# Patient Record
Sex: FEMALE | Race: WHITE | URBAN - METROPOLITAN AREA
[De-identification: names, ages, dates, MRNs, and addresses within clinical notes are randomized per-mention and may not be internally consistent; named-entity substitution may affect disease eponyms.]

---

## 2020-09-25 ENCOUNTER — OFFICE VISIT (OUTPATIENT)
Dept: ORTHOPEDIC SURGERY | Age: 30
End: 2020-09-25
Payer: MEDICAID

## 2020-09-25 VITALS
DIASTOLIC BLOOD PRESSURE: 76 MMHG | SYSTOLIC BLOOD PRESSURE: 123 MMHG | TEMPERATURE: 97.3 F | WEIGHT: 174 LBS | HEIGHT: 62 IN | OXYGEN SATURATION: 98 % | RESPIRATION RATE: 15 BRPM | HEART RATE: 73 BPM | BODY MASS INDEX: 32.02 KG/M2

## 2020-09-25 DIAGNOSIS — M54.2 CERVICAL PAIN: Primary | ICD-10-CM

## 2020-09-25 DIAGNOSIS — M54.12 RIGHT CERVICAL RADICULOPATHY: ICD-10-CM

## 2020-09-25 PROCEDURE — 99203 OFFICE O/P NEW LOW 30 MIN: CPT | Performed by: ORTHOPAEDIC SURGERY

## 2020-09-25 PROCEDURE — 72050 X-RAY EXAM NECK SPINE 4/5VWS: CPT | Performed by: ORTHOPAEDIC SURGERY

## 2020-09-25 RX ORDER — ONDANSETRON 4 MG/1
TABLET, ORALLY DISINTEGRATING ORAL
COMMUNITY
Start: 2020-09-02 | End: 2021-01-28 | Stop reason: ALTCHOICE

## 2020-09-25 RX ORDER — VENLAFAXINE HYDROCHLORIDE 150 MG/1
150 CAPSULE, EXTENDED RELEASE ORAL DAILY
COMMUNITY
Start: 2020-09-24 | End: 2022-03-11

## 2020-09-25 RX ORDER — LEVOTHYROXINE SODIUM 25 UG/1
TABLET ORAL
COMMUNITY
Start: 2020-09-11

## 2020-09-25 RX ORDER — GABAPENTIN 300 MG/1
600 CAPSULE ORAL 3 TIMES DAILY
Qty: 180 CAP | Refills: 0 | Status: SHIPPED | OUTPATIENT
Start: 2020-09-25 | End: 2020-12-03 | Stop reason: SDUPTHER

## 2020-09-25 RX ORDER — DROSPIRENONE AND ETHINYL ESTRADIOL 0.02-3(28)
1 KIT ORAL DAILY
COMMUNITY

## 2020-09-25 RX ORDER — FLUTICASONE PROPIONATE 50 MCG
2 SPRAY, SUSPENSION (ML) NASAL DAILY
COMMUNITY

## 2020-09-25 RX ORDER — ERGOCALCIFEROL 1.25 MG/1
CAPSULE ORAL
COMMUNITY
Start: 2020-07-20

## 2020-09-25 RX ORDER — GABAPENTIN 300 MG/1
600 CAPSULE ORAL
COMMUNITY
Start: 2020-09-06

## 2020-09-25 RX ORDER — VALACYCLOVIR HYDROCHLORIDE 1 G/1
TABLET, FILM COATED ORAL
COMMUNITY
Start: 2020-08-27

## 2020-09-25 RX ORDER — LORATADINE 10 MG/1
20 TABLET ORAL DAILY
COMMUNITY

## 2020-09-25 RX ORDER — MODAFINIL 100 MG/1
TABLET ORAL
COMMUNITY
Start: 2020-09-14 | End: 2022-03-11

## 2020-09-25 RX ORDER — MONTELUKAST SODIUM 10 MG/1
TABLET ORAL
COMMUNITY
Start: 2020-09-11

## 2020-09-25 RX ORDER — METHYLPREDNISOLONE 4 MG/1
1 TABLET ORAL
COMMUNITY
Start: 2020-09-22 | End: 2020-12-14 | Stop reason: ALTCHOICE

## 2020-09-25 NOTE — PROGRESS NOTES
Lisa hCen presents today for   Chief Complaint   Patient presents with    Neck Pain    Arm Pain     right       Is someone accompanying this pt? no    Is the patient using any DME equipment during OV? no      Coordination of Care:  1. Have you been to the ER, urgent care clinic since your last visit? Yes, pt went to the ER on 09/06/2020 and 09/21/2020 for neck pain and numbness in her right arm. Notes in care everywhere  Hospitalized since your last visit? no    2. Have you seen or consulted any other health care providers outside of the [address] [place] [address] since your last visit? no Include any pap smears or colon screening.  no

## 2020-09-25 NOTE — LETTER
9/25/20 Patient: Di Lou YOB: 1990 Date of Visit: 9/25/2020 PRINCE Liu 
[address] [place] [address] VIA Facsimile: [phone] Dear PRINCE Liu, Thank you for referring Ms. Russell [place] to [address] [facility] for evaluation. My notes for this consultation are attached. If you have questions, please do not hesitate to call me. I look forward to following your patient along with you.  
 
 
Sincerely, 
 
Augustus Chilel MD

## 2020-09-25 NOTE — PROGRESS NOTES
[facility]  [address] [facility]  Phone: [phone]  Fax: [phone]  INITIAL CONSULTATION  Patient: Lisa Chen                MRN: [id number]       SSN: [id number]  YOB: 1990        AGE: 31 y.o. SEX: female  Body mass index is 31.83 kg/m². PCP: PRINCE Kay  09/25/20    Chief Complaint   Patient presents with    Neck Pain    Arm Pain     right         HISTORY OF PRESENT ILLNESS, RADIOGRAPHS, and PLAN:       Ms. Melissa Mar is seen today in the ER referral.  Ms. Melissa Mar is a 61-year-old female with history of psoriasis and allergies she works as a on a horse farm. About 3 weeks ago she noted some C6 numbness when while driving in her right hand really consistent with a carpal tunnel type syndrome that then went to the left hand that progressed to more of a whole arm right arm numbness with occasional pain in her right biceps she has no real neck pain denies bowel bladder dysfunction fever chills night sweats weight loss or weight gain. She has been started on gabapentin which she takes 600 mg at night and a steroid Dosepak which appeared to help somewhat and she is now getting through her second 1. Her physical exam demonstrates good strength numbness in her hands bilaterally no reflex changes no gait changes no long track signs. MRI of her cervical spine demonstrates mild bulging of the C5 5 6 disc may be slightly right paracentral but no significant nerve root or cord compression no signal change. I do not see pathology that would explain the patient's symptoms. It could be an unusual manifestation of a mild C5-6 protrusion but that would appear to be unlikely. In any case the symptoms are relatively acute evolving going on for 3 to 4 weeks. They seem to be moderated by steroids. It is too early to get a nerve conduction test on her.   The gabapentin she is taking is ineffectual dosing I have told her to slowly ramp up to 3 times a day dosing to see if this medication can be effective for her. Otherwise we should stop it. I will see her back in a couple weeks to monitor her exam and her symptoms. If they are still present after 6 weeks I think is not unreasonable to consider nerve conduction test but still on the early side. We will see her back in 2 to 3 weeks. This dictation was created utilizing voice recognition software. Errors may be present. No past medical history on file. No family history on file. Current Outpatient Medications   Medication Sig Dispense Refill    ergocalciferol (ERGOCALCIFEROL) 1,250 mcg (50,000 unit) capsule TAKE 1 CAPSULE BY MOUTH ONCE A WEEK      drospirenone-ethinyl estradioL (MULU) 3-0.02 mg tab Take 1 Tab by mouth daily.  gabapentin (NEURONTIN) 300 mg capsule Take 600 mg by mouth nightly.  levothyroxine (SYNTHROID) 25 mcg tablet TAKE 1 TAB IN THE MORNING ON AN EMPTY STOMACH. PLEASE WAIT 30 MINUTES BEFORE TAKING ANY OTHER MEDS      methylPREDNISolone (MEDROL DOSEPACK) 4 mg tablet Take 1 Tab by mouth.  modafiniL (PROVIGIL) 100 mg tablet TAKE 1 TABLET BY MOUTH DAILY AT 7 A.M, AND 1 TABLET AT 12 PM      montelukast (SINGULAIR) 10 mg tablet TAKE 1 TABLET BY MOUTH EVERYDAY AT BEDTIME      ondansetron (ZOFRAN ODT) 4 mg disintegrating tablet PLACE 1 2 TABLETS UNDER THE TONGUE EVERY 6 HOURS AS NEEDED FOR NAUSEA/VOMITING. MAY CAUSE DROWSINESS.  valACYclovir (VALTREX) 1 gram tablet TAKE 2 TABLETS BY MOUTH AT FIRST SIGN OF RASH, REPEAT DOSE IN 12 HOURS      venlafaxine-SR (EFFEXOR-XR) 150 mg capsule Take 150 mg by mouth daily.  loratadine (Claritin) 10 mg tablet Take 10 mg by mouth daily as needed for Allergies.  fluticasone propionate (Flonase Allergy Relief) 50 mcg/actuation nasal spray 2 Sprays by Both Nostrils route daily.  USTEKINAUMAB 90 mg/mL injection 90 mg by SubCUTAneous route every three (3) months.       VENTOLIN HFA 90 mcg/actuation inhaler inhale 1-2 puff by mouth four times a day if needed as directed  0    buPROPion XL (WELLBUTRIN XL) 300 mg XL tablet Take 37.5 mg by mouth every morning. 0    clobetasol 0.05 % sham       phenazopyridine (PYRIDIUM) 200 mg tablet TAKE 1 TAB BY MOUTH 3 TIMES A DAY FOR 2 DAYS  0       No Known Allergies    No past surgical history on file. No past medical history on file. Social History     Socioeconomic History    Marital status:      Spouse name: Not on file    Number of children: Not on file    Years of education: Not on file    Highest education level: Not on file   Occupational History    Not on file   Social Needs    Financial resource strain: Not on file    Food insecurity     Worry: Not on file     Inability: Not on file    Transportation needs     Medical: Not on file     Non-medical: Not on file   Tobacco Use    Smoking status: Never Smoker    Smokeless tobacco: Never Used   Substance and Sexual Activity    Alcohol use: Yes    Drug use: No    Sexual activity: Not on file   Lifestyle    Physical activity     Days per week: Not on file     Minutes per session: Not on file    Stress: Not on file   Relationships    Social connections     Talks on phone: Not on file     Gets together: Not on file     Attends [employer]ism service: Not on file     Active member of club or organization: Not on file     Attends meetings of clubs or organizations: Not on file     Relationship status: Not on file    Intimate partner violence     Fear of current or ex partner: Not on file     Emotionally abused: Not on file     Physically abused: Not on file     Forced sexual activity: Not on file   Other Topics Concern    Not on file   Social History Narrative    Not on file           REVIEW OF SYSTEMS:   CONSTITUTIONAL SYMPTOMS:  Negative. EYES:  Negative. EARS, NOSE, THROAT AND MOUTH:  Negative. CARDIOVASCULAR:  Negative. RESPIRATORY:  Negative.    GENITOURINARY: Per HPI.   GASTROINTESTINAL:  Per HPI. INTEGUMENTARY (SKIN AND/OR BREAST):  Negative. MUSCULOSKELETAL: Per HPI.   ENDOCRINE/RHEUMATOLOGIC:  Negative. NEUROLOGICAL:  Per HPI. HEMATOLOGIC/LYMPHATIC:  Negative. ALLERGIC/IMMUNOLOGIC:  Negative. PSYCHIATRIC:  Negative. PHYSICAL EXAMINATION:   Visit Vitals  /76 (BP 1 Location: Left arm, BP Patient Position: Sitting)   Pulse 73   Temp 97.3 °F (36.3 °C) (Skin)   Resp 15   Ht 5' 2\" (1.575 m)   Wt 174 lb (78.9 kg)   LMP 09/19/2020   SpO2 98% Comment: RA   BMI 31.83 kg/m²    PAIN SCALE: 6/10    CONSTITUTIONAL: The patient is in no apparent distress and is alert and oriented x 3. HEENT: Normocephalic. Hearing grossly intact. NECK: Supple and symmetric. no tenderness, or masses were felt. RESPIRATORY: No labored breathing. CARDIOVASCULAR: The carotid pulses were normal. Peripheral pulses were 2+. CHEST: Normal AP diameter and normal contour without any kyphoscoliosis. LYMPHATIC: No lymphadenopathy was appreciated in the neck, axillae or groin. SKIN:  Negative for scars, rashes, lesions, or ulcers on the right upper, right lower, left upper, left lower and trunk. NEUROLOGICAL: Alert and oriented x 3. Ambulation without assistive device. FWB. EXTREMITIES:  See musculoskeletal.  MUSCULOSKELETAL:   Head and Neck: Negative for misalignment, asymmetry, crepitation, defects, tenderness masses or effusions.  Left Upper Extremity: Numbness and tingling. Inspection, percussion and palpation performed. Martinezs sign is negative.  Right Upper Extremity: Numbness and tingling. Inspection, percussion and palpation performed. Martinezs sign is negative.  Spine, Ribs and Pelvis: Inspection, percussion and palpation performed. Negative for misalignment, asymmetry, crepitation, defects, tenderness masses or effusions.  Left Lower Extremity: Inspection, percussion and palpation performed. Negative straight leg raise.    Right Lower Extremity: Inspection, percussion and palpation performed. Negative straight leg raise. SPINE EXAM:     Cervical spine: Neck is midline. Normal muscle tone. No focal atrophy is noted. Lumbar spine: No rash, ecchymosis, or gross obliquity. No focal atrophy is noted. Biceps  Triceps Deltoids Wrist Ext Wrist Flex Hand Intrin   Right +4/5 +4/5 +4/5 +4/5 +4/5 +4/5   Left +4/5 +4/5 +4/5 +4/5 +4/5 +4/5      Hip Flex  Quads Hamstrings Tib-Ant EHL Ankle PF/DF   Right +4/5 +4/5 +4/5 +4/5 +4/5 +4/5   Left +4/5 +4/5 +4/5 +4/5 +4/5 +4/5       ASSESSMENT    ICD-10-CM ICD-9-CM    1. Cervical pain  M54.2 723.1 AMB POC XRAY, SPINE, CERVICAL; 4+ VIE   2. Right cervical radiculopathy  M54.12 723.4 gabapentin (Neurontin) 300 mg capsule       Written by Kassie Calderon, as dictated by Manfred Jaramillo MD.    I, Dr. Manfred Jaramillo MD, confirm that all documentation is accurate.

## 2020-12-03 DIAGNOSIS — M54.12 RIGHT CERVICAL RADICULOPATHY: ICD-10-CM

## 2020-12-03 RX ORDER — GABAPENTIN 300 MG/1
600 CAPSULE ORAL 2 TIMES DAILY
Qty: 180 CAP | Refills: 1 | Status: SHIPPED | OUTPATIENT
Start: 2020-12-03 | End: 2022-03-11

## 2020-12-03 NOTE — TELEPHONE ENCOUNTER
Patient no showed her apt with Dr. Lillie Garnett. I would recommend she get the gabapentin refill from her PCP if she is not going to be following up with [facility].

## 2020-12-03 NOTE — TELEPHONE ENCOUNTER
Patient called again regarding this, she states the previous call was disconnected before she could finish giving information. She states she is supposed to work a full day today but she's only working a half day due to the pain, so she's scheduled to go to work at noon. She is asking if this refill could possibly be done before that. She confirmed again her pharmacy is [facility] on [address] [facility] and is requesting a call back when completed at [phone].

## 2020-12-03 NOTE — TELEPHONE ENCOUNTER
Patient called and is asking for a refill on Gabapentin medication from [address] [place] [address] [address] . Patient said she ran out of the medication this past Monday. Patient said that she is having pain and numbness on the right arm and left hand . [facility] on [address] [facility]. [phone]. Patient tel. [phone].

## 2020-12-03 NOTE — TELEPHONE ENCOUNTER
Spoke with pt, she stated she takes the Gabapentin 300mg 1 to 2 tabs QHS or 1 tab po BID. Pt pharmacy on file confirmed. Pt also stated that the PCP has not been seeing pt and that is why they will not fill the Gabapentin. She states all she knows is without the Gabapentin she has little to no strength in her RT arm. Unable to lift or use the arm. Pt scheduled fu with Dr. Naomi Reno for 12/18 stating that the issue has not gotten any better, it has gotten worse. Pt also inquired about the EMG that Dr. Naomi Reno mentioned. I informed her that he did not order the EMG his note stated *If they are still present after 6 weeks I think is not unreasonable to consider nerve conduction test but still on the early side. We will see her back in 2 to 3 weeks. * Informed pt that it sounded like Dr. Naomi Reno would decide at the fu if he should order the EMG. Pt states it has not gotten better, and can we order the EMG before she sees him, and have it done prior to the 18th. Please advise.

## 2020-12-03 NOTE — TELEPHONE ENCOUNTER
This is a controlled substance. Please see my message below. I am willing to send in a week supply for her, but further refills should come from PCP. Please confirm how she is taking this.

## 2020-12-14 ENCOUNTER — OFFICE VISIT (OUTPATIENT)
Dept: ORTHOPEDIC SURGERY | Age: 30
End: 2020-12-14
Payer: MEDICAID

## 2020-12-14 ENCOUNTER — TELEPHONE (OUTPATIENT)
Dept: ORTHOPEDIC SURGERY | Age: 30
End: 2020-12-14

## 2020-12-14 VITALS
DIASTOLIC BLOOD PRESSURE: 83 MMHG | OXYGEN SATURATION: 97 % | BODY MASS INDEX: 31.47 KG/M2 | TEMPERATURE: 97.2 F | WEIGHT: 171 LBS | HEIGHT: 62 IN | RESPIRATION RATE: 12 BRPM | HEART RATE: 78 BPM | SYSTOLIC BLOOD PRESSURE: 126 MMHG

## 2020-12-14 DIAGNOSIS — R94.131 ABNORMAL EMG: ICD-10-CM

## 2020-12-14 DIAGNOSIS — R20.0 NUMBNESS AND TINGLING OF RIGHT UPPER EXTREMITY: Primary | ICD-10-CM

## 2020-12-14 DIAGNOSIS — R20.2 NUMBNESS AND TINGLING OF RIGHT UPPER EXTREMITY: Primary | ICD-10-CM

## 2020-12-14 PROCEDURE — 95909 NRV CNDJ TST 5-6 STUDIES: CPT | Performed by: PHYSICAL MEDICINE & REHABILITATION

## 2020-12-14 PROCEDURE — 95886 MUSC TEST DONE W/N TEST COMP: CPT | Performed by: PHYSICAL MEDICINE & REHABILITATION

## 2020-12-14 NOTE — PROGRESS NOTES
[address]  [address]  [address]  Phone: [phone]  Fax: [phone]        Toby Montemayor  : 1990  PCP: PRINCE Mcdaniel  2020    ELECTROMYOGRAPHY AND NERVE CONDUCTION STUDIES    Evelyn De Leon was referred by Dr. Shannon Soares for electrodiagnostic evaluation of RUE paraesthesia. NCV & EMG Findings:  Evaluation of the right median motor nerve showed prolonged distal onset latency (5.1 ms). The right median sensory nerve showed no response (Wrist). All remaining nerves (as indicated in the following tables) were within normal limits. All examined muscles (as indicated in the following table) showed no evidence of electrical instability. INTERPRETATION  This is an abnormal electrodiagnostic examination. These findings may be consistent with:   1. Moderate/severe median mononeuropathy at the right wrist (carpal tunnel syndrome)    There is no electrodiagnostic evidence of any cervical radiculopathy, brachial plexopathy, peripheral polyneuropathy, or any other mononeuropathy. CLINICAL INTERPRETATION  Her electrodiagnostic findings of right carpal tunnel syndrome are consistent with her right arm symptoms. HISTORY OF PRESENT ILLNESS  Devendra Engel is a 31 y.o. female. Pt presents today for RUE EMG evaluation of right hand numbness and tingling. 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 However, when she is resting or driving, she experiences constant numbness and tingling in the right hand. It sometimes radiates up her arm as well. PAST MEDICAL HISTORY   No past medical history on file. No past surgical history on file. Dae Maza MEDICATIONS      Current Outpatient Medications   Medication Sig Dispense Refill    gabapentin (Neurontin) 300 mg capsule Take 2 Caps by mouth two (2) times a day.  Max Daily Amount: 1,200 mg. 180 Cap 1    ergocalciferol (ERGOCALCIFEROL) 1,250 mcg (50,000 unit) capsule TAKE 1 CAPSULE BY MOUTH ONCE A WEEK      drospirenone-ethinyl estradioL (MULU) 3-0.02 mg tab Take 1 Tab by mouth daily.  gabapentin (NEURONTIN) 300 mg capsule Take 600 mg by mouth nightly.  levothyroxine (SYNTHROID) 25 mcg tablet TAKE 1 TAB IN THE MORNING ON AN EMPTY STOMACH. PLEASE WAIT 30 MINUTES BEFORE TAKING ANY OTHER MEDS      modafiniL (PROVIGIL) 100 mg tablet TAKE 1 TABLET BY MOUTH DAILY AT 7 A.M, AND 1 TABLET AT 12 PM      montelukast (SINGULAIR) 10 mg tablet TAKE 1 TABLET BY MOUTH EVERYDAY AT BEDTIME      valACYclovir (VALTREX) 1 gram tablet TAKE 2 TABLETS BY MOUTH AT FIRST SIGN OF RASH, REPEAT DOSE IN 12 HOURS      venlafaxine-SR (EFFEXOR-XR) 150 mg capsule Take 150 mg by mouth daily.  loratadine (Claritin) 10 mg tablet Take 20 mg by mouth daily.  fluticasone propionate (Flonase Allergy Relief) 50 mcg/actuation nasal spray 2 Sprays by Both Nostrils route daily.  USTEKINAUMAB 90 mg/mL injection 90 mg by SubCUTAneous route every three (3) months.  VENTOLIN HFA 90 mcg/actuation inhaler inhale 1-2 puff by mouth four times a day if needed as directed  0    buPROPion XL (WELLBUTRIN XL) 300 mg XL tablet Take 200 mg by mouth every morning. 0    clobetasol 0.05 % sham       ondansetron (ZOFRAN ODT) 4 mg disintegrating tablet PLACE 1 2 TABLETS UNDER THE TONGUE EVERY 6 HOURS AS NEEDED FOR NAUSEA/VOMITING. MAY CAUSE DROWSINESS.  phenazopyridine (PYRIDIUM) 200 mg tablet TAKE 1 TAB BY MOUTH 3 TIMES A DAY FOR 2 DAYS  0        ALLERGIES  No Known Allergies       SOCIAL HISTORY    Social History     Socioeconomic History    Marital status:      Spouse name: Not on file    Number of children: Not on file    Years of education: Not on file    Highest education level: Not on file   Tobacco Use    Smoking status: Never Smoker    Smokeless tobacco: Never Used   Substance and Sexual Activity    Alcohol use:  Yes    Drug use: No       FAMILY HISTORY  No family history on file. PHYSICAL EXAMINATION  Visit Vitals  /83 (BP 1 Location: Left arm, BP Patient Position: Sitting)   Pulse 78   Temp 97.2 °F (36.2 °C) (Skin)   Resp 12   Ht 5' 2\" (1.575 m)   Wt 171 lb (77.6 kg)   SpO2 97% Comment: RA   BMI 31.28 kg/m²       Pain Assessment  12/14/2020   Location of Pain Neck;Arm   Location Modifiers Right   Severity of Pain 0   Quality of Pain Sharp; Throbbing; Other (Comment)   Quality of Pain Comment numbness to right arm/hand   Duration of Pain Persistent   Frequency of Pain Intermittent   Aggravating Factors Other (Comment)   Aggravating Factors Comment pain tends to be worse at night after working all day   Limiting Behavior Yes   Relieving Factors Nothing   Relieving Factors Comment -   Result of Injury No           Constitutional:  Well developed, well nourished, in no acute distress. Psychiatric: Affect and mood are appropriate. Integumentary: No rashes or abrasions noted on exposed areas. SPINE/MUSCULOSKELETAL EXAM    On brief examination: None.       NCV & EMG Findings:  Nerve Conduction Studies  Anti Sensory Summary Table     Stim Site NR Peak (ms) Norm Peak (ms) O-P Amp (µV) Norm O-P Amp Site1 Site2 Delta-P (ms) Dist (cm) El (m/s) Norm El (m/s)   Right Median Anti Sensory (2nd Digit)   Wrist NR  <3.6  >10 Wrist 2nd Digit  14.0  >39   Right Radial Anti Sensory (Base 1st Digit)   Wrist    1.8 <3.1 37.1  Wrist Base 1st Digit 1.8 0.0     Right Ulnar Anti Sensory (5th Digit)   Wrist    3.0 <3.7 28.7 >15.0 Wrist 5th Digit 3.0 14.0 47 >38     Motor Summary Table     Stim Site NR Onset (ms) Norm Onset (ms) O-P Amp (mV) Norm O-P Amp Site1 Site2 Delta-0 (ms) Dist (cm) El (m/s) Norm El (m/s)   Right Median Motor (Abd Poll Brev)   Wrist    5.1 <4.2 5.8 >5 Elbow Wrist 3.2 20.0 62 >50   Elbow    8.3  5.7          Right Ulnar Motor (Abd Dig Min)   Wrist    2.3 <4.2 13.4 >3 B Elbow Wrist 2.9 17.5 60 >53   B Elbow    5.2  12.8  A Elbow B Elbow 1.8 10.0 56 >53   A Elbow    7.0  12.0            EMG     Side Muscle Nerve Root Ins Act Fibs Psw Amp Dur Poly Recrt Int Min Deep Comment   Right Biceps Musculocut C5-6 Nml Nml Nml Nml Nml 0 Nml Nml    Right Triceps Radial C6-7-8 Nml Nml Nml Nml Nml 0 Nml Nml    Right PronatorTeres Median C6-7 Nml Nml Nml Nml Nml 0 Nml Nml    Right Abd Poll Brev Median C8-T1 Nml Nml Nml Nml Nml 0 Nml Nml    Right 1stDorInt Ulnar C8-T1 Nml Nml Nml Nml Nml 0 Nml Nml    Right Cervical Parasp Up Rami C1-3 Nml Nml Nml         Right Cervical Parasp Mid Rami C4-6 Nml Nml Nml         Right Cervical Parasp Low Rami C7-8 Nml Nml Nml             Nerve Conduction Studies  Anti Sensory Left/Right Comparison     Stim Site L Lat (ms) R Lat (ms) L-R Lat (ms) L Amp (µV) R Amp (µV) L-R Amp (%) Site1 Site2 L El (m/s) R El (m/s) L-R El (m/s)   Median Anti Sensory (2nd Digit)   Wrist       Wrist 2nd Digit      Radial Anti Sensory (Base 1st Digit)   Wrist  1.8   37.1  Wrist Base 1st Digit      Ulnar Anti Sensory (5th Digit)   Wrist  3.0   28.7  Wrist 5th Digit  47      Motor Left/Right Comparison     Stim Site L Lat (ms) R Lat (ms) L-R Lat (ms) L Amp (mV) R Amp (mV) L-R Amp (%) Site1 Site2 L El (m/s) R El (m/s) L-R El (m/s)   Median Motor (Abd Poll Brev)   Wrist  5.1   5.8  Elbow Wrist  62    Elbow  8.3   5.7         Ulnar Motor (Abd Dig Min)   Wrist  2.3   13.4  B Elbow Wrist  60    B Elbow  5.2   12.8  A Elbow B Elbow  56    A Elbow  7.0   12.0               Waveforms:                     [facility]  OFFICE PROCEDURE PROGRESS NOTE        Chart reviewed for the following:   Ronn IZAGUIRRE, have reviewed the History, Physical and updated the Allergic reactions for [employer]     TIME OUT performed immediately prior to start of procedure:   Ronn IZAGUIRRE, have performed the following reviews on Evelyn Greene prior to the start of the procedure:            * Patient was identified by name and date of birth   * Agreement on procedure being performed was verified  * Risks and Benefits explained to the patient  * Procedure site verified and marked as necessary  * Patient was positioned for comfort  * Consent was signed and verified     Time: 11:03 AM    Date of procedure: 12/14/2020    Procedure performed by:  Champ Hollins MD    Provider accompanied by: Pamela. Patient accompanied by: Self.     How tolerated by patient: tolerated the procedure well with no complications    Post Procedural Pain Scale: 0 - No Hurt    Comments: none    Written by Gareth Moon, [address] as dictated by Davy Espinal MD

## 2020-12-14 NOTE — PROGRESS NOTES
Felix Davis presents today for   Chief Complaint   Patient presents with    Arm Pain     right    Numbness       Is someone accompanying this pt? no    Is the patient using any DME equipment during OV? no      Coordination of Care:  1. Have you been to the ER, urgent care clinic since your last visit? no  Hospitalized since your last visit? no    2. Have you seen or consulted any other health care providers outside of the [address] [place] [address] since your last visit? no Include any pap smears or colon screening.  no

## 2020-12-18 ENCOUNTER — OFFICE VISIT (OUTPATIENT)
Dept: ORTHOPEDIC SURGERY | Age: 30
End: 2020-12-18
Payer: MEDICAID

## 2020-12-18 VITALS
WEIGHT: 171 LBS | HEART RATE: 75 BPM | HEIGHT: 62 IN | SYSTOLIC BLOOD PRESSURE: 114 MMHG | DIASTOLIC BLOOD PRESSURE: 76 MMHG | TEMPERATURE: 96.6 F | RESPIRATION RATE: 18 BRPM | OXYGEN SATURATION: 97 % | BODY MASS INDEX: 31.47 KG/M2

## 2020-12-18 DIAGNOSIS — G56.01 RIGHT CARPAL TUNNEL SYNDROME: Primary | ICD-10-CM

## 2020-12-18 DIAGNOSIS — G56.02 LEFT CARPAL TUNNEL SYNDROME: ICD-10-CM

## 2020-12-18 PROCEDURE — 20526 THER INJECTION CARP TUNNEL: CPT | Performed by: ORTHOPAEDIC SURGERY

## 2020-12-18 PROCEDURE — 99214 OFFICE O/P EST MOD 30 MIN: CPT | Performed by: ORTHOPAEDIC SURGERY

## 2020-12-18 NOTE — PROGRESS NOTES
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 Worker's Compensation and legal considerations: none filed. Vitals:    12/18/20 1002   BP: 114/76   Pulse: 75   Resp: 18   Temp: (!) 96.6 °F (35.9 °C)   SpO2: 97%   Weight: 171 lb (77.6 kg)   Height: 5' 2\" (1.575 m)   PainSc:   2           Chief Complaint   Patient presents with    Hand Pain     right         HPI: Patient comes in today with complaints of bilateral hand pain and numbness significantly worse than the right. She is recently had a right EMG but not a left. Patient says that she will have to wait till the spring to set up surgery for the right side. Date of onset: Chronic    Injury: No    Prior Treatment:  No    Numbness/ Tingling: Yes: Comment: Bilateral      ROS: Review of Systems - General ROS: negative  Psychological ROS: negative  ENT ROS: negative  Allergy and Immunology ROS: negative  Hematological and Lymphatic ROS: negative  Respiratory ROS: no cough, shortness of breath, or wheezing  Cardiovascular ROS: no chest pain or dyspnea on exertion  Gastrointestinal ROS: no abdominal pain, change in bowel habits, or black or bloody stools  Musculoskeletal ROS: positive for - pain in hand - right  Neurological ROS: positive for - numbness/tingling  Dermatological ROS: negative    Past Medical History:   Diagnosis Date    Asthma     High cholesterol        History reviewed. No pertinent surgical history. Current Outpatient Medications   Medication Sig Dispense Refill    ergocalciferol (ERGOCALCIFEROL) 1,250 mcg (50,000 unit) capsule TAKE 1 CAPSULE BY MOUTH ONCE A WEEK      drospirenone-ethinyl estradioL (MULU) 3-0.02 mg tab Take 1 Tab by mouth daily.  gabapentin (NEURONTIN) 300 mg capsule Take 600 mg by mouth nightly.  levothyroxine (SYNTHROID) 25 mcg tablet TAKE 1 TAB IN THE MORNING ON AN EMPTY STOMACH.  PLEASE WAIT 30 MINUTES BEFORE TAKING ANY OTHER MEDS      modafiniL (PROVIGIL) 100 mg tablet TAKE 1 TABLET BY MOUTH DAILY AT 7 A.M, AND 1 TABLET AT 12 PM      montelukast (SINGULAIR) 10 mg tablet TAKE 1 TABLET BY MOUTH EVERYDAY AT BEDTIME      ondansetron (ZOFRAN ODT) 4 mg disintegrating tablet PLACE 1 2 TABLETS UNDER THE TONGUE EVERY 6 HOURS AS NEEDED FOR NAUSEA/VOMITING. MAY CAUSE DROWSINESS.  valACYclovir (VALTREX) 1 gram tablet TAKE 2 TABLETS BY MOUTH AT FIRST SIGN OF RASH, REPEAT DOSE IN 12 HOURS      venlafaxine-SR (EFFEXOR-XR) 150 mg capsule Take 150 mg by mouth daily.  loratadine (Claritin) 10 mg tablet Take 20 mg by mouth daily.  fluticasone propionate (Flonase Allergy Relief) 50 mcg/actuation nasal spray 2 Sprays by Both Nostrils route daily.  USTEKINAUMAB 90 mg/mL injection 90 mg by SubCUTAneous route every three (3) months.  VENTOLIN HFA 90 mcg/actuation inhaler inhale 1-2 puff by mouth four times a day if needed as directed  0    buPROPion XL (WELLBUTRIN XL) 300 mg XL tablet Take 200 mg by mouth every morning. 0    clobetasol 0.05 % sham       gabapentin (Neurontin) 300 mg capsule Take 2 Caps by mouth two (2) times a day. Max Daily Amount: 1,200 mg. 180 Cap 1    phenazopyridine (PYRIDIUM) 200 mg tablet TAKE 1 TAB BY MOUTH 3 TIMES A DAY FOR 2 DAYS  0     Current Facility-Administered Medications   Medication Dose Route Frequency Provider Last Rate Last Admin    triamcinolone acetonide (KENALOG) 10 mg/mL injection 10 mg  10 mg Other ONCE Marc Copeland, DO           No Known Allergies        PE:     Physical Exam  Vitals signs and nursing note reviewed. Constitutional:       General: She is not in acute distress. Appearance: Normal appearance. She is not ill-appearing. Eyes:      Extraocular Movements: Extraocular movements intact. Pupils: Pupils are equal, round, and reactive to light. Neck:      Musculoskeletal: Normal range of motion and neck supple. Cardiovascular:      Pulses: Normal pulses.    Pulmonary:      Effort: Pulmonary effort is normal. No respiratory distress. Abdominal:      General: Abdomen is flat. There is no distension. Musculoskeletal: Normal range of motion. General: No swelling, tenderness, deformity or signs of injury. Right lower leg: No edema. Left lower leg: No edema. Skin:     General: Skin is warm and dry. Capillary Refill: Capillary refill takes less than 2 seconds. Findings: No bruising or erythema. Neurological:      General: No focal deficit present. Mental Status: She is alert and oriented to person, place, and time. Cranial Nerves: No cranial nerve deficit. Sensory: No sensory deficit. Psychiatric:         Mood and Affect: Mood normal.         Behavior: Behavior normal.            NEUROVASCULAR    Examination L R Examination L R   Carpal Comp. ? + Pronator Comp. - -   Carpal Tinel ? + Pronator Tinel - -   Phalen's ? + Pronator Stress - -   Cubital Comp. - - Guyon Comp. - -   Cubital Tinel - - Guyon Tinel - -   Elbow Hyperflexion - - Adson's - -   Spurling's - - SC Comp. - -   PCB Median abn - - SC Tinel - -   Radial Tinel - - IC Comp. - -   Digital Tinel - - IC Tinel - -   Radial 2-Pt WNL WNL Ulnar 2-Pt WNL WNL     Radial Pulse: 2+  Capillary Refill: < 2 sec  Anton[employer]: Not Performed  [employer]: Not Performed      NCV & EMG Findings:  Evaluation of the right median motor nerve showed prolonged distal onset latency (5.1 ms). The right median sensory nerve showed no response (Wrist). All remaining nerves (as indicated in the following tables) were within normal limits.       All examined muscles (as indicated in the following table) showed no evidence of electrical instability.       INTERPRETATION  This is an abnormal electrodiagnostic examination. These findings may be consistent with:   1.  Moderate/severe median mononeuropathy at the right wrist (carpal tunnel syndrome)     There is no electrodiagnostic evidence of any cervical radiculopathy, brachial plexopathy, peripheral polyneuropathy, or any other mononeuropathy.     CLINICAL INTERPRETATION  Her electrodiagnostic findings of right carpal tunnel syndrome are consistent with her right arm symptoms. Imaging:     None indicated        ICD-10-CM ICD-9-CM    1. Right carpal tunnel syndrome  G56.01 354.0 AMB SUPPLY ORDER      triamcinolone acetonide (KENALOG) 10 mg/mL injection 10 mg      INJECT CARPAL TUNNEL   2. Left carpal tunnel syndrome  G56.02 354.0 AMB SUPPLY ORDER      EMG ONE EXTREMITY UPPER LT      NCV/LAT MOTOR PER NERVE UP/LT      triamcinolone acetonide (KENALOG) 10 mg/mL injection 10 mg      INJECT CARPAL TUNNEL         Plan:     Bilateral carpal tunnel injections. Bilateral carpal tunnel braces. Left upper extremity EMG    Follow-up and Dispositions    · Return in about 3 months (around 3/18/2021) for Reevaluation, left EMG review, and surgical discussion for right. Plan was reviewed with patient, who verbalized agreement and understanding of the plan    [address] [facility] NOTE        Chart reviewed for the following:   Marc IZAGUIRRE DO, have reviewed the History, Physical and updated the Allergic reactions for [employer]     TIME OUT performed immediately prior to start of procedure:   Marc IZAGUIRRE DO, have performed the following reviews on [employer] prior to the start of the procedure:            * Patient was identified by name and date of birth   * Agreement on procedure being performed was verified  * Risks and Benefits explained to the patient  * Procedure site verified and marked as necessary  * Patient was positioned for comfort  * Consent was signed and verified     Time: 10:32 AM      Date of procedure: 12/18/2020    Procedure performed by:   Yoselin Pink DO    Provider assisted by: Sebastian Hollingsworth LPN    Patient assisted by: self    How tolerated by patient: tolerated the procedure well with no complications    Post Procedural Pain Scale: 0 - No Hurt    Comments: none    Procedure:  After consent was obtained, using sterile technique the carpal tunnel was prepped. Local anesthetic used: 1% lidocaine. Kenalog 5 mg X2 and was then injected and the needle withdrawn. The procedure was well tolerated. The patient is asked to continue to rest the area for a few more days before resuming regular activities. It may be more painful for the first 1-2 days. Watch for fever, or increased swelling or persistent pain in the joint. Call or return to clinic prn if such symptoms occur or there is failure to improve as anticipated.

## 2020-12-25 DIAGNOSIS — G56.02 LEFT CARPAL TUNNEL SYNDROME: ICD-10-CM

## 2021-01-11 ENCOUNTER — TELEPHONE (OUTPATIENT)
Dept: ORTHOPEDIC SURGERY | Age: 31
End: 2021-01-11

## 2021-01-11 NOTE — TELEPHONE ENCOUNTER
Pt lost her job due to [place] so she now has the time to do the carpal tunnel surgery on her right side and wishes to proceed with scheduling. Please contact pt at [phone] or [phone].

## 2021-01-13 DIAGNOSIS — Z01.818 PREOP EXAMINATION: Primary | ICD-10-CM

## 2021-01-13 NOTE — TELEPHONE ENCOUNTER
Spoke with patient and scheduled for [facility] on 1/28. H&P appt 1/22 at [facility].

## 2021-01-22 ENCOUNTER — OFFICE VISIT (OUTPATIENT)
Dept: ORTHOPEDIC SURGERY | Age: 31
End: 2021-01-22

## 2021-01-22 VITALS
TEMPERATURE: 98.4 F | BODY MASS INDEX: 30.98 KG/M2 | HEART RATE: 72 BPM | DIASTOLIC BLOOD PRESSURE: 78 MMHG | WEIGHT: 169.4 LBS | SYSTOLIC BLOOD PRESSURE: 120 MMHG | OXYGEN SATURATION: 100 %

## 2021-01-22 DIAGNOSIS — Z01.818 PREOP EXAMINATION: ICD-10-CM

## 2021-01-22 DIAGNOSIS — G56.01 RIGHT CARPAL TUNNEL SYNDROME: Primary | ICD-10-CM

## 2021-01-22 NOTE — PROGRESS NOTES
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 Worker's Compensation and legal considerations: none filed. Vitals:    01/22/21 1319   BP: 120/78   Pulse: 72   Temp: 98.4 °F (36.9 °C)   TempSrc: Tympanic   SpO2: 100%   Weight: 169 lb 6.4 oz (76.8 kg)   PainSc:   0 - No pain   PainLoc: Hand           Chief Complaint   Patient presents with    Hand Pain       HPI: Patient presents today for preoperative history and physical as she is scheduled for a right carpal tunnel release. She previously had injections that helped however the numbness is returning on the right. Initial HPI: Patient comes in today with complaints of bilateral hand pain and numbness significantly worse than the right. She is recently had a right EMG but not a left. Patient says that she will have to wait till the spring to set up surgery for the right side. Date of onset: Chronic    Injury: No    Prior Treatment:  Yes: Comment: Bilateral carpal tunnel injections    Numbness/ Tingling: Yes: Comment: Bilateral      ROS: Review of Systems - General ROS: negative  Psychological ROS: negative  ENT ROS: negative  Allergy and Immunology ROS: negative  Hematological and Lymphatic ROS: negative  Respiratory ROS: no cough, shortness of breath, or wheezing  Cardiovascular ROS: no chest pain or dyspnea on exertion  Gastrointestinal ROS: no abdominal pain, change in bowel habits, or black or bloody stools  Musculoskeletal ROS: positive for - pain in hand - right  Neurological ROS: positive for - numbness/tingling  Dermatological ROS: negative    Past Medical History:   Diagnosis Date    Asthma     High cholesterol        No past surgical history on file. Current Outpatient Medications   Medication Sig Dispense Refill    gabapentin (Neurontin) 300 mg capsule Take 2 Caps by mouth two (2) times a day.  Max Daily Amount: 1,200 mg. 180 Cap 1    ergocalciferol (ERGOCALCIFEROL) 1,250 mcg (50,000 unit) capsule TAKE 1 CAPSULE BY MOUTH ONCE A WEEK      drospirenone-ethinyl estradioL (MULU) 3-0.02 mg tab Take 1 Tab by mouth daily.  gabapentin (NEURONTIN) 300 mg capsule Take 600 mg by mouth nightly.  levothyroxine (SYNTHROID) 25 mcg tablet TAKE 1 TAB IN THE MORNING ON AN EMPTY STOMACH. PLEASE WAIT 30 MINUTES BEFORE TAKING ANY OTHER MEDS      modafiniL (PROVIGIL) 100 mg tablet TAKE 1 TABLET BY MOUTH DAILY AT 7 A.M, AND 1 TABLET AT 12 PM      montelukast (SINGULAIR) 10 mg tablet TAKE 1 TABLET BY MOUTH EVERYDAY AT BEDTIME      ondansetron (ZOFRAN ODT) 4 mg disintegrating tablet PLACE 1 2 TABLETS UNDER THE TONGUE EVERY 6 HOURS AS NEEDED FOR NAUSEA/VOMITING. MAY CAUSE DROWSINESS.  valACYclovir (VALTREX) 1 gram tablet TAKE 2 TABLETS BY MOUTH AT FIRST SIGN OF RASH, REPEAT DOSE IN 12 HOURS      venlafaxine-SR (EFFEXOR-XR) 150 mg capsule Take 150 mg by mouth daily.  loratadine (Claritin) 10 mg tablet Take 20 mg by mouth daily.  fluticasone propionate (Flonase Allergy Relief) 50 mcg/actuation nasal spray 2 Sprays by Both Nostrils route daily.  USTEKINAUMAB 90 mg/mL injection 90 mg by SubCUTAneous route every three (3) months.  VENTOLIN HFA 90 mcg/actuation inhaler inhale 1-2 puff by mouth four times a day if needed as directed  0    buPROPion XL (WELLBUTRIN XL) 300 mg XL tablet Take 200 mg by mouth every morning. 0    clobetasol 0.05 % sham          No Known Allergies        PE:     Physical Exam  Vitals signs and nursing note reviewed. Constitutional:       General: She is not in acute distress. Appearance: Normal appearance. She is not ill-appearing. Eyes:      Extraocular Movements: Extraocular movements intact. Pupils: Pupils are equal, round, and reactive to light. Neck:      Musculoskeletal: Normal range of motion and neck supple. Cardiovascular:      Pulses: Normal pulses. Pulmonary:      Effort: Pulmonary effort is normal. No respiratory distress.    Abdominal:      General: Abdomen is flat. There is no distension. Musculoskeletal: Normal range of motion. General: No swelling, tenderness, deformity or signs of injury. Right lower leg: No edema. Left lower leg: No edema. Skin:     General: Skin is warm and dry. Capillary Refill: Capillary refill takes less than 2 seconds. Findings: No bruising or erythema. Neurological:      General: No focal deficit present. Mental Status: She is alert and oriented to person, place, and time. Cranial Nerves: No cranial nerve deficit. Sensory: No sensory deficit. Psychiatric:         Mood and Affect: Mood normal.         Behavior: Behavior normal.            NEUROVASCULAR    Examination L R Examination L R   Carpal Comp. ? + Pronator Comp. - -   Carpal Tinel ? + Pronator Tinel - -   Phalen's ? + Pronator Stress - -   Cubital Comp. - - Guyon Comp. - -   Cubital Tinel - - Guyon Tinel - -   Elbow Hyperflexion - - Adson's - -   Spurling's - - SC Comp. - -   PCB Median abn - - SC Tinel - -   Radial Tinel - - IC Comp. - -   Digital Tinel - - IC Tinel - -   Radial 2-Pt WNL WNL Ulnar 2-Pt WNL WNL     Radial Pulse: 2+  Capillary Refill: < 2 sec  [employer]: Not Performed  [employer]: Not Performed      NCV & EMG Findings:  Evaluation of the right median motor nerve showed prolonged distal onset latency (5.1 ms). The right median sensory nerve showed no response (Wrist). All remaining nerves (as indicated in the following tables) were within normal limits.       All examined muscles (as indicated in the following table) showed no evidence of electrical instability.       INTERPRETATION  This is an abnormal electrodiagnostic examination. These findings may be consistent with:   1.  Moderate/severe median mononeuropathy at the right wrist (carpal tunnel syndrome)     There is no electrodiagnostic evidence of any cervical radiculopathy, brachial plexopathy, peripheral polyneuropathy, or any other mononeuropathy.     CLINICAL INTERPRETATION  Her electrodiagnostic findings of right carpal tunnel syndrome are consistent with her right arm symptoms. Imaging:     None indicated        ICD-10-CM ICD-9-CM    1. Right carpal tunnel syndrome  G56.01 354.0          Plan:     Proceed to schedule for right carpal tunnel release    This procedure has been fully reviewed with the patient and written informed consent has been obtained. The patient was counseled at length about the risks of jacob Covid-19 during their perioperative period and any recovery window from their procedure. The patient was made aware that jacob Covid-19  may worsen their prognosis for recovering from their procedure and lend to a higher morbidity and/or mortality risk. All material risks, benefits, and reasonable alternatives including postponing the procedure were discussed. The patient does  wish to proceed with the procedure at this time. Follow-up and Dispositions    · Return for as scheduled for postop wound check.           Plan was reviewed with patient, who verbalized agreement and understanding of the plan

## 2021-01-24 ENCOUNTER — PATIENT MESSAGE (OUTPATIENT)
Dept: ORTHOPEDIC SURGERY | Age: 31
End: 2021-01-24

## 2021-01-25 NOTE — TELEPHONE ENCOUNTER
Attempted to reach patient to answer questions sent via [employer] message. Please transfer patient to me if she call back.

## 2021-01-28 DIAGNOSIS — R11.2 DRUG-INDUCED NAUSEA AND VOMITING: ICD-10-CM

## 2021-01-28 DIAGNOSIS — T50.905A DRUG-INDUCED NAUSEA AND VOMITING: ICD-10-CM

## 2021-01-28 DIAGNOSIS — Z98.890 S/P CARPAL TUNNEL RELEASE: ICD-10-CM

## 2021-01-28 DIAGNOSIS — G56.01 RIGHT CARPAL TUNNEL SYNDROME: Primary | ICD-10-CM

## 2021-01-28 RX ORDER — ONDANSETRON 4 MG/1
4 TABLET, ORALLY DISINTEGRATING ORAL
Qty: 9 TAB | Refills: 0 | Status: SHIPPED | OUTPATIENT
Start: 2021-01-28 | End: 2021-01-31

## 2021-01-28 RX ORDER — HYDROCODONE BITARTRATE AND ACETAMINOPHEN 5; 325 MG/1; MG/1
1 TABLET ORAL
Qty: 12 TAB | Refills: 0 | Status: SHIPPED | OUTPATIENT
Start: 2021-01-28 | End: 2021-01-31

## 2021-05-06 ENCOUNTER — OFFICE VISIT (OUTPATIENT)
Dept: ORTHOPEDIC SURGERY | Age: 31
End: 2021-05-06
Payer: OTHER GOVERNMENT

## 2021-05-06 VITALS
BODY MASS INDEX: 32.02 KG/M2 | HEART RATE: 94 BPM | OXYGEN SATURATION: 99 % | TEMPERATURE: 97.3 F | WEIGHT: 174 LBS | HEIGHT: 62 IN

## 2021-05-06 DIAGNOSIS — G56.01 RIGHT CARPAL TUNNEL SYNDROME: ICD-10-CM

## 2021-05-06 DIAGNOSIS — Z98.890 S/P CARPAL TUNNEL RELEASE: ICD-10-CM

## 2021-05-06 DIAGNOSIS — G56.02 LEFT CARPAL TUNNEL SYNDROME: Primary | ICD-10-CM

## 2021-05-06 PROCEDURE — 99213 OFFICE O/P EST LOW 20 MIN: CPT | Performed by: ORTHOPAEDIC SURGERY

## 2021-05-06 PROCEDURE — 20526 THER INJECTION CARP TUNNEL: CPT | Performed by: ORTHOPAEDIC SURGERY

## 2021-05-06 NOTE — PROGRESS NOTES
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 Worker's Compensation and legal considerations: none filed. Vitals:    05/06/21 1514   Pulse: 94   Temp: 97.3 °F (36.3 °C)   TempSrc: Temporal   SpO2: 99%   Weight: 174 lb (78.9 kg)   Height: 5' 2\" (1.575 m)   PainSc:   1   PainLoc: Hand           Chief Complaint   Patient presents with    Hand Pain     left hand       HPI: Patient presents today with a new problem of left hand pain numbness and tingling. She previously had right carpal tunnel release but has not been seen for follow-up since. She reports that it healed well however she still has some tenderness on the right hand. Preop HPI: Patient presents today for preoperative history and physical as she is scheduled for a right carpal tunnel release. She previously had injections that helped however the numbness is returning on the right. Initial HPI: Patient comes in today with complaints of bilateral hand pain and numbness significantly worse than the right. She is recently had a right EMG but not a left. Patient says that she will have to wait till the spring to set up surgery for the right side.     Date of onset: Chronic    Injury: No    Prior Treatment:  Yes: Comment: Bilateral carpal tunnel injections and right carpal tunnel release    Numbness/ Tingling: Yes: Comment: Bilateral      ROS: Review of Systems - General ROS: negative  Psychological ROS: negative  ENT ROS: negative  Allergy and Immunology ROS: negative  Hematological and Lymphatic ROS: negative  Respiratory ROS: no cough, shortness of breath, or wheezing  Cardiovascular ROS: no chest pain or dyspnea on exertion  Gastrointestinal ROS: no abdominal pain, change in bowel habits, or black or bloody stools  Musculoskeletal ROS: positive for - pain in hand - right  Neurological ROS: positive for - numbness/tingling  Dermatological ROS: negative    Past Medical History:   Diagnosis Date    Asthma     High cholesterol Past Surgical History:   Procedure Laterality Date    FOOT/TOES SURGERY PROC UNLISTED Left 02/2016    left foot reconstruction    HAND/FINGER SURGERY UNLISTED Right     right hand CTR       Current Outpatient Medications   Medication Sig Dispense Refill    gabapentin (Neurontin) 300 mg capsule Take 2 Caps by mouth two (2) times a day. Max Daily Amount: 1,200 mg. 180 Cap 1    ergocalciferol (ERGOCALCIFEROL) 1,250 mcg (50,000 unit) capsule TAKE 1 CAPSULE BY MOUTH ONCE A WEEK      gabapentin (NEURONTIN) 300 mg capsule Take 600 mg by mouth nightly.  levothyroxine (SYNTHROID) 25 mcg tablet TAKE 1 TAB IN THE MORNING ON AN EMPTY STOMACH. PLEASE WAIT 30 MINUTES BEFORE TAKING ANY OTHER MEDS      modafiniL (PROVIGIL) 100 mg tablet TAKE 1 TABLET BY MOUTH DAILY AT 7 A.M, AND 1 TABLET AT 12 PM      montelukast (SINGULAIR) 10 mg tablet TAKE 1 TABLET BY MOUTH EVERYDAY AT BEDTIME      valACYclovir (VALTREX) 1 gram tablet TAKE 2 TABLETS BY MOUTH AT FIRST SIGN OF RASH, REPEAT DOSE IN 12 HOURS      venlafaxine-SR (EFFEXOR-XR) 150 mg capsule Take 150 mg by mouth daily.  loratadine (Claritin) 10 mg tablet Take 20 mg by mouth daily.  fluticasone propionate (Flonase Allergy Relief) 50 mcg/actuation nasal spray 2 Sprays by Both Nostrils route daily.  USTEKINAUMAB 90 mg/mL injection 90 mg by SubCUTAneous route every three (3) months.  VENTOLIN HFA 90 mcg/actuation inhaler inhale 1-2 puff by mouth four times a day if needed as directed  0    buPROPion XL (WELLBUTRIN XL) 300 mg XL tablet Take 200 mg by mouth every morning. 0    clobetasol 0.05 % sham       drospirenone-ethinyl estradioL (MULU) 3-0.02 mg tab Take 1 Tab by mouth daily.        Current Facility-Administered Medications   Medication Dose Route Frequency Provider Last Rate Last Admin    triamcinolone acetonide (KENALOG) 10 mg/mL injection 5 mg  5 mg Other ONCE Marc Copeland, DO           No Known Allergies        PE:     Physical Exam  Vitals signs and nursing note reviewed. Constitutional:       General: She is not in acute distress. Appearance: Normal appearance. She is not ill-appearing. Neck:      Musculoskeletal: Normal range of motion and neck supple. Cardiovascular:      Pulses: Normal pulses. Pulmonary:      Effort: Pulmonary effort is normal.   Musculoskeletal: Normal range of motion. General: No swelling, tenderness, deformity or signs of injury. Right lower leg: No edema. Left lower leg: No edema. Skin:     General: Skin is warm and dry. Capillary Refill: Capillary refill takes less than 2 seconds. Findings: No bruising or erythema. Neurological:      General: No focal deficit present. Mental Status: She is alert and oriented to person, place, and time. Cranial Nerves: No cranial nerve deficit. Sensory: No sensory deficit. Psychiatric:         Mood and Affect: Mood normal.         Behavior: Behavior normal.            NEUROVASCULAR    Examination L R Examination L R   Carpal Comp. -  Pronator Comp. - -   Carpal Tinel +  Pronator Tinel - -   Phalen's ? Pronator Stress - -   Cubital Comp. - - Guyon Comp. - -   Cubital Tinel - - Guyon Tinel - -   Elbow Hyperflexion - - Adson's - -   Spurling's - - SC Comp. - -   PCB Median abn - - SC Tinel - -   Radial Tinel - - IC Comp. - -   Digital Tinel - - IC Tinel - -   Radial 2-Pt WNL WNL Ulnar 2-Pt WNL WNL     Radial Pulse: 2+  Capillary Refill: < 2 sec  [employer]y: Not Performed  [employer]: Not Performed      NCV & EMG Findings:  Evaluation of the right median motor nerve showed prolonged distal onset latency (5.1 ms). The right median sensory nerve showed no response (Wrist).   All remaining nerves (as indicated in the following tables) were within normal limits.       All examined muscles (as indicated in the following table) showed no evidence of electrical instability.       INTERPRETATION  This is an abnormal electrodiagnostic examination. These findings may be consistent with:   1. Moderate/severe median mononeuropathy at the right wrist (carpal tunnel syndrome)     There is no electrodiagnostic evidence of any cervical radiculopathy, brachial plexopathy, peripheral polyneuropathy, or any other mononeuropathy.     CLINICAL INTERPRETATION  Her electrodiagnostic findings of right carpal tunnel syndrome are consistent with her right arm symptoms. Imaging:     None indicated        ICD-10-CM ICD-9-CM    1. Left carpal tunnel syndrome  G56.02 354.0 NCV/LAT MOTOR PER NERVE UP/LT      EMG ONE EXTREMITY UPPER LT      triamcinolone acetonide (KENALOG) 10 mg/mL injection 5 mg      INJECT CARPAL TUNNEL   2. Right carpal tunnel syndrome  G56.01 354.0    3. S/P carpal tunnel release  Z98.890 V45.89          Plan:     Left carpal tunnel injection and left upper extremity EMG    Continue nighttime brace wear    Also discussed scar massage for right side. Follow-up and Dispositions    · Return if symptoms worsen or fail to improve. Plan was reviewed with patient, who verbalized agreement and understanding of the plan    [address] [facility] NOTE        Chart reviewed for the following:   Marc IZAGUIRRE DO, have reviewed the History, Physical and updated the Allergic reactions for [address] [place] [address] performed immediately prior to start of procedure:   Marc IZAGUIRRE DO, have performed the following reviews on [address] [place] [address] prior to the start of the procedure:            * Patient was identified by name and date of birth   * Agreement on procedure being performed was verified  * Risks and Benefits explained to the patient  * Procedure site verified and marked as necessary  * Patient was positioned for comfort  * Consent was signed and verified     Time: 15:25      Date of procedure: 5/6/2021    Procedure performed by:   Heidi Choi DO    Provider assisted by: Judy Peterson LPN    Patient assisted by: self    How tolerated by patient: tolerated the procedure well with no complications    Post Procedural Pain Scale: 0 - No Hurt    Comments: none    Procedure:  After consent was obtained, using sterile technique the left carpal tunnel was prepped. Local anesthetic used: 1% lidocaine. Kenalog 5 mg and was then injected and the needle withdrawn. The procedure was well tolerated. The patient is asked to continue to rest the area for a few more days before resuming regular activities. It may be more painful for the first 1-2 days. Watch for fever, or increased swelling or persistent pain in the joint. Call or return to clinic prn if such symptoms occur or there is failure to improve as anticipated.

## 2021-05-13 DIAGNOSIS — G56.02 LEFT CARPAL TUNNEL SYNDROME: ICD-10-CM

## 2021-09-09 ENCOUNTER — OFFICE VISIT (OUTPATIENT)
Dept: ORTHOPEDIC SURGERY | Age: 31
End: 2021-09-09
Payer: OTHER GOVERNMENT

## 2021-09-09 VITALS
HEIGHT: 62 IN | HEART RATE: 95 BPM | BODY MASS INDEX: 32.02 KG/M2 | TEMPERATURE: 97.6 F | WEIGHT: 174 LBS | OXYGEN SATURATION: 98 %

## 2021-09-09 DIAGNOSIS — G56.02 LEFT CARPAL TUNNEL SYNDROME: Primary | ICD-10-CM

## 2021-09-09 PROCEDURE — 99213 OFFICE O/P EST LOW 20 MIN: CPT | Performed by: ORTHOPAEDIC SURGERY

## 2021-09-09 PROCEDURE — 20526 THER INJECTION CARP TUNNEL: CPT | Performed by: ORTHOPAEDIC SURGERY

## 2021-09-09 RX ORDER — DICLOFENAC SODIUM 75 MG/1
75 TABLET, DELAYED RELEASE ORAL 2 TIMES DAILY WITH MEALS
Qty: 20 TABLET | Refills: 0 | Status: SHIPPED | OUTPATIENT
Start: 2021-09-09 | End: 2021-09-10 | Stop reason: CLARIF

## 2021-09-09 NOTE — LETTER
NOTIFICATION RETURN TO WORK    9/9/2021 11:45 AM    Ms. Emely Valenzuela  [address] [address]      To Whom It May Concern:    Emely Valenzuela is currently under the care of [address] [place] [address] Uriah Arnett. She was seen in our office today 9/9/2021. If there are questions or concerns please have the patient contact our office.         Sincerely,      Zain Millard, DO

## 2021-09-09 NOTE — PROGRESS NOTES
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 Worker's Compensation and legal considerations: none filed. Vitals:    09/09/21 1047   Pulse: 95   Temp: 97.6 °F (36.4 °C)   TempSrc: Temporal   SpO2: 98%   Weight: 174 lb (78.9 kg)   Height: 5' 2\" (1.575 m)   PainSc:   9   PainLoc: Hand           Chief Complaint   Patient presents with    Hand Pain     left hand        HPI: Patient presents today requesting surgery for her left carpal tunnel syndrome. We previously scheduled EMGs but she canceled these and has not had them rescheduled. She would also like an injection today. 8/25/2021 HPI: Patient presents today with a new problem of left hand pain numbness and tingling. She previously had right carpal tunnel release but has not been seen for follow-up since. She reports that it healed well however she still has some tenderness on the right hand. Preop HPI: Patient presents today for preoperative history and physical as she is scheduled for a right carpal tunnel release. She previously had injections that helped however the numbness is returning on the right. Initial HPI: Patient comes in today with complaints of bilateral hand pain and numbness significantly worse than the right. She is recently had a right EMG but not a left. Patient says that she will have to wait till the spring to set up surgery for the right side.     Date of onset: Chronic    Injury: No    Prior Treatment:  Yes: Comment: Bilateral carpal tunnel injections and right carpal tunnel release    Numbness/ Tingling: Yes: Comment: Bilateral      ROS: Review of Systems - General ROS: negative  Psychological ROS: negative  ENT ROS: negative  Allergy and Immunology ROS: negative  Hematological and Lymphatic ROS: negative  Respiratory ROS: no cough, shortness of breath, or wheezing  Cardiovascular ROS: no chest pain or dyspnea on exertion  Gastrointestinal ROS: no abdominal pain, change in bowel habits, or black or bloody stools  Musculoskeletal ROS: positive for - pain in hand - right  Neurological ROS: positive for - numbness/tingling  Dermatological ROS: negative    Past Medical History:   Diagnosis Date    Asthma     High cholesterol        Past Surgical History:   Procedure Laterality Date    FOOT/TOES SURGERY PROC UNLISTED Left 02/2016    left foot reconstruction    HAND/FINGER SURGERY UNLISTED Right     right hand CTR       Current Outpatient Medications   Medication Sig Dispense Refill    diclofenac EC (VOLTAREN) 75 mg EC tablet Take 1 Tablet by mouth two (2) times daily (with meals) for 10 days. 20 Tablet 0    gabapentin (Neurontin) 300 mg capsule Take 2 Caps by mouth two (2) times a day. Max Daily Amount: 1,200 mg. 180 Cap 1    ergocalciferol (ERGOCALCIFEROL) 1,250 mcg (50,000 unit) capsule TAKE 1 CAPSULE BY MOUTH ONCE A WEEK      gabapentin (NEURONTIN) 300 mg capsule Take 600 mg by mouth nightly.  levothyroxine (SYNTHROID) 25 mcg tablet TAKE 1 TAB IN THE MORNING ON AN EMPTY STOMACH. PLEASE WAIT 30 MINUTES BEFORE TAKING ANY OTHER MEDS      modafiniL (PROVIGIL) 100 mg tablet TAKE 1 TABLET BY MOUTH DAILY AT 7 A.M, AND 1 TABLET AT 12 PM      montelukast (SINGULAIR) 10 mg tablet TAKE 1 TABLET BY MOUTH EVERYDAY AT BEDTIME      valACYclovir (VALTREX) 1 gram tablet TAKE 2 TABLETS BY MOUTH AT FIRST SIGN OF RASH, REPEAT DOSE IN 12 HOURS      venlafaxine-SR (EFFEXOR-XR) 150 mg capsule Take 150 mg by mouth daily.  loratadine (Claritin) 10 mg tablet Take 20 mg by mouth daily.  fluticasone propionate (Flonase Allergy Relief) 50 mcg/actuation nasal spray 2 Sprays by Both Nostrils route daily.  USTEKINAUMAB 90 mg/mL injection 90 mg by SubCUTAneous route every three (3) months.  VENTOLIN HFA 90 mcg/actuation inhaler inhale 1-2 puff by mouth four times a day if needed as directed  0    buPROPion XL (WELLBUTRIN XL) 300 mg XL tablet Take 200 mg by mouth every morning.   0    clobetasol 0.05 % sham  drospirenone-ethinyl estradioL (MULU) 3-0.02 mg tab Take 1 Tab by mouth daily. (Patient not taking: Reported on 9/9/2021)         No Known Allergies        PE:     Physical Exam  Vitals and nursing note reviewed. Constitutional:       General: She is not in acute distress. Appearance: Normal appearance. She is not ill-appearing. Cardiovascular:      Pulses: Normal pulses. Pulmonary:      Effort: Pulmonary effort is normal.   Musculoskeletal:         General: No swelling, tenderness, deformity or signs of injury. Normal range of motion. Cervical back: Normal range of motion and neck supple. Right lower leg: No edema. Left lower leg: No edema. Skin:     General: Skin is warm and dry. Capillary Refill: Capillary refill takes less than 2 seconds. Findings: No bruising or erythema. Neurological:      General: No focal deficit present. Mental Status: She is alert and oriented to person, place, and time. Cranial Nerves: No cranial nerve deficit. Sensory: No sensory deficit. Psychiatric:         Mood and Affect: Mood normal.         Behavior: Behavior normal.            NEUROVASCULAR    Examination L R Examination L R   Carpal Comp. -  Pronator Comp. - -   Carpal Tinel +  Pronator Tinel - -   Phalen's ? Pronator Stress - -   Cubital Comp. - - Guyon Comp. - -   Cubital Tinel - - Guyon Tinel - -   Elbow Hyperflexion - - Adson's - -   Spurling's - - SC Comp. - -   PCB Median abn - - SC Tinel - -   Radial Tinel - - IC Comp. - -   Digital Tinel - - IC Tinel - -   Radial 2-Pt WNL WNL Ulnar 2-Pt WNL WNL     Radial Pulse: 2+  Capillary Refill: < 2 sec  Antony: Not Performed  [employer]: Not Performed      NCV & EMG Findings:  Evaluation of the right median motor nerve showed prolonged distal onset latency (5.1 ms). The right median sensory nerve showed no response (Wrist). All remaining nerves (as indicated in the following tables) were within normal limits.    All examined muscles (as indicated in the following table) showed no evidence of electrical instability.       INTERPRETATION  This is an abnormal electrodiagnostic examination. These findings may be consistent with:   1. Moderate/severe median mononeuropathy at the right wrist (carpal tunnel syndrome)     There is no electrodiagnostic evidence of any cervical radiculopathy, brachial plexopathy, peripheral polyneuropathy, or any other mononeuropathy.     CLINICAL INTERPRETATION  Her electrodiagnostic findings of right carpal tunnel syndrome are consistent with her right arm symptoms. Imaging:     None indicated        ICD-10-CM ICD-9-CM    1. Left carpal tunnel syndrome  G56.02 354.0 triamcinolone acetonide (KENALOG) 10 mg/mL injection 5 mg      INJECT CARPAL TUNNEL      diclofenac EC (VOLTAREN) 75 mg EC tablet         Plan:     Repeat left carpal tunnel injection and reschedule EMG. Continue nighttime brace wear. Follow-up and Dispositions    · Return for EMG Review. Plan was reviewed with patient, who verbalized agreement and understanding of the plan    [address] [facility] NOTE        Chart reviewed for the following:   Marc IZAGUIRRE DO, have reviewed the History, Physical and updated the Allergic reactions for [address] [place] [address] performed immediately prior to start of procedure:   Marc IZAGUIRRE DO, have performed the following reviews on [address] [place] [address] prior to the start of the procedure:            * Patient was identified by name and date of birth   * Agreement on procedure being performed was verified  * Risks and Benefits explained to the patient  * Procedure site verified and marked as necessary  * Patient was positioned for comfort  * Consent was signed and verified     Time: 11:20 AM      Date of procedure: 9/9/2021    Procedure performed by:   Elayne Chambers DO    Provider assisted by: Alicia Kelly Lisa BALLARD    Patient assisted by: self    How tolerated by patient: tolerated the procedure well with no complications    Post Procedural Pain Scale: 0 - No Hurt    Comments: none    Procedure:  After consent was obtained, using sterile technique the left carpal tunnel was prepped. Local anesthetic used: 1% lidocaine. Kenalog 5 mg and was then injected and the needle withdrawn. The procedure was well tolerated. The patient is asked to continue to rest the area for a few more days before resuming regular activities. It may be more painful for the first 1-2 days. Watch for fever, or increased swelling or persistent pain in the joint. Call or return to clinic prn if such symptoms occur or there is failure to improve as anticipated.

## 2021-10-19 ENCOUNTER — DOCUMENTATION ONLY (OUTPATIENT)
Dept: ORTHOPEDIC SURGERY | Age: 31
End: 2021-10-19

## 2021-10-19 NOTE — PROGRESS NOTES
Attempted to contact patient to make her aware that follow up appt on 10/21 with Dr. Junior oT has been cancelled, as patient arrived late to EMG appt on 10/18 and it was not completed. Patient's surgery has also been cancelled and may be rescheduled once patient has completed EMG.

## 2021-10-19 NOTE — PROGRESS NOTES
PLEASE NOTE NO SHOW LETTER REFLECTED IN C/C FOR APPT OF 10/18/2021 WAS NOT SENT.   (PT WAS NO SHOWED PRIOR TO HER LATE ARRIVAL OF 45 MINUTES LATE) AND DUE TO MULTIPLE & CONSECUTIVE NO SHOWS/ CX'S FOR THIS PROVIDER/PROCEDURE(EMG)  PER [facility] THIS PATIENT HAS LOST PRIVILEGES TO BE SCHEDULED FOR AN EMG WITH THIS PRACTICE. WILL HAS BEEN NOTIFIED.

## 2021-12-22 ENCOUNTER — OFFICE VISIT (OUTPATIENT)
Dept: ORTHOPEDIC SURGERY | Age: 31
End: 2021-12-22
Payer: OTHER GOVERNMENT

## 2021-12-22 VITALS
OXYGEN SATURATION: 99 % | RESPIRATION RATE: 16 BRPM | HEART RATE: 85 BPM | HEIGHT: 62 IN | TEMPERATURE: 97.2 F | BODY MASS INDEX: 33.75 KG/M2 | WEIGHT: 183.4 LBS

## 2021-12-22 DIAGNOSIS — G56.02 LEFT CARPAL TUNNEL SYNDROME: Primary | ICD-10-CM

## 2021-12-22 DIAGNOSIS — R20.0 NUMBNESS AND TINGLING OF RIGHT UPPER EXTREMITY: ICD-10-CM

## 2021-12-22 DIAGNOSIS — R20.2 NUMBNESS AND TINGLING OF RIGHT UPPER EXTREMITY: ICD-10-CM

## 2021-12-22 PROCEDURE — 20526 THER INJECTION CARP TUNNEL: CPT | Performed by: PHYSICIAN ASSISTANT

## 2021-12-22 PROCEDURE — 99213 OFFICE O/P EST LOW 20 MIN: CPT | Performed by: PHYSICIAN ASSISTANT

## 2021-12-22 RX ORDER — DEXTROAMPHETAMINE SACCHARATE, AMPHETAMINE ASPARTATE, DEXTROAMPHETAMINE SULFATE AND AMPHETAMINE SULFATE 5; 5; 5; 5 MG/1; MG/1; MG/1; MG/1
20 TABLET ORAL
COMMUNITY

## 2021-12-22 NOTE — PROGRESS NOTES
Patient: lCarence Bhakta                MRN: [id number]       SSN: [id number]  YOB: 1990        AGE: 36 y.o. SEX: female          PCP: Eduardo Limon  12/22/21    Chief Complaint   Patient presents with    Hand Pain     left hand pain       HISTORY:  Clarence Bhakta is a 36 y.o. female presents to the office with a recurrence of left hand numbness tingling with a known history of clinical diagnosis of carpal tunnel syndrome. Patient is left-hand dominant. She has been seen in the care of Dr. Abdirizak Ballard service and received a cortisone injection to the left carpal tunnel space. That did help for several months relieving her numbness tingling and pain. She has not undergone an EMG nerve conduction study of the left upper extremity for several reasons but primarily a conflict with scheduling. Patient would like a cortisone injection today. Pain Assessment  12/22/2021   Location of Pain Hand   Location Modifiers Left   Severity of Pain 4   Quality of Pain (No Data)   Quality of Pain Comment discomfort. numbness in the morning.    Duration of Pain Persistent   Frequency of Pain Constant   Aggravating Factors Other (Comment)   Aggravating Factors Comment using the hands   Limiting Behavior -   Relieving Factors Nothing   Relieving Factors Comment -   Result of Injury No           No results found for: HBA1C, MLC5IHOF, SYU0CEVM  Weight Metrics 12/22/2021 9/9/2021 5/6/2021 1/22/2021 12/18/2020 12/14/2020 9/25/2020   Weight 183 lb 6.4 oz 174 lb 174 lb 169 lb 6.4 oz 171 lb 171 lb 174 lb   BMI 33.54 kg/m2 31.83 kg/m2 31.83 kg/m2 30.98 kg/m2 31.28 kg/m2 31.28 kg/m2 31.83 kg/m2            Problem List Items Addressed This Visit     None      Visit Diagnoses     Left carpal tunnel syndrome    -  Primary    Relevant Medications    dextroamphetamine-amphetamine (AdderalL) 20 mg tablet    Numbness and tingling of right upper extremity PAST MEDICAL HISTORY:   Past Medical History:   Diagnosis Date    Asthma     High cholesterol        PAST SURGICAL HISTORY:   Past Surgical History:   Procedure Laterality Date    FOOT/TOES SURGERY PROC UNLISTED Left 02/2016    left foot reconstruction    HAND/FINGER SURGERY UNLISTED Right     right hand CTR       ALLERGIES: No Known Allergies     CURRENT MEDICATIONS:  A list of medications prior to the time of admission include:  Prior to Admission medications    Medication Sig Start Date End Date Taking? Authorizing Provider   dextroamphetamine-amphetamine (AdderalL) 20 mg tablet Take 20 mg by mouth. Yes Provider, Historical   gabapentin (Neurontin) 300 mg capsule Take 2 Caps by mouth two (2) times a day. Max Daily Amount: 1,200 mg. 12/3/20  Yes Cris Weinberg, MIRANDA   ergocalciferol (ERGOCALCIFEROL) 1,250 mcg (50,000 unit) capsule TAKE 1 CAPSULE BY MOUTH ONCE A WEEK 7/20/20  Yes Provider, Historical   gabapentin (NEURONTIN) 300 mg capsule Take 600 mg by mouth nightly. 9/6/20  Yes Provider, Historical   levothyroxine (SYNTHROID) 25 mcg tablet TAKE 1 TAB IN THE MORNING ON AN EMPTY STOMACH. PLEASE WAIT 30 MINUTES BEFORE TAKING ANY OTHER MEDS 9/11/20  Yes Provider, Historical   montelukast (SINGULAIR) 10 mg tablet TAKE 1 TABLET BY MOUTH EVERYDAY AT BEDTIME 9/11/20  Yes Provider, Historical   valACYclovir (VALTREX) 1 gram tablet TAKE 2 TABLETS BY MOUTH AT FIRST SIGN OF RASH, REPEAT DOSE IN 12 HOURS 8/27/20  Yes Provider, Historical   venlafaxine-SR (EFFEXOR-XR) 150 mg capsule Take 150 mg by mouth daily. 9/24/20  Yes Provider, Historical   loratadine (Claritin) 10 mg tablet Take 20 mg by mouth daily. Yes Provider, Historical   fluticasone propionate (Flonase Allergy Relief) 50 mcg/actuation nasal spray 2 Sprays by Both Nostrils route daily. Yes Provider, Historical   USTEKINAUMAB 90 mg/mL injection 90 mg by SubCUTAneous route every three (3) months.  11/6/18  Yes Provider, Historical   VENTOLIN HFA 90 mcg/actuation inhaler inhale 1-2 puff by mouth four times a day if needed as directed 8/10/18  Yes Provider, Historical   buPROPion XL (WELLBUTRIN XL) 300 mg XL tablet Take 200 mg by mouth every morning. 10/3/18  Yes Provider, Historical   clobetasol 0.05 % sham  8/1/18  Yes Provider, Historical   drospirenone-ethinyl estradioL (MULU) 3-0.02 mg tab Take 1 Tab by mouth daily. Patient not taking: Reported on 9/9/2021    Provider, Historical   modafiniL (PROVIGIL) 100 mg tablet TAKE 1 TABLET BY MOUTH DAILY AT 7 A.M, AND 1 TABLET AT 12 PM  Patient not taking: Reported on 12/22/2021 9/14/20   Provider, Historical       FAMILY HISTORY: No family history on file. SOCIAL HISTORY:   Social History     Socioeconomic History    Marital status:    Tobacco Use    Smoking status: Never Smoker    Smokeless tobacco: Never Used   Substance and Sexual Activity    Alcohol use: Yes    Drug use: No       ROS:No CP, No SOB, No fever/chills nor night sweats. No headaches, vision abnormalities to include double and oral loss of vision. No hearing abnormalities. Musculoskeletal pain per HPI. Pain is exacerbated positionally. Pt denies h/o spinal surgery, injections, or PT/chiropractor. Self treated with less than adequate relief on oral antiinflammatories. . Pt denies change in bowel or bladder habits. Pt denies fever, weight loss, or skin changes. PHYSICAL EXAM:    Visit Vitals  Pulse 85   Temp 97.2 °F (36.2 °C) (Temporal)   Resp 16   Ht 5' 2\" (1.575 m)   Wt 183 lb 6.4 oz (83.2 kg)   SpO2 99%   BMI 33.54 kg/m²       Constitutional: Appears well-developed and well-nourished. No distress. Sitting comfortably in the exam room, interacting with conversation with pleasant affect. Gait is steady and patient exhibits no evidence of ataxia. Patient is able to ambulate without difficulty. No focal neurological deficit noted.  No facial droop, slurred speech, or evidence of altered mentation noted on exam.   Skin: Skin over the head, neck, bilateral limbs, and trunk is warm and dry. No rash or erythema noted. Cranial Nerves II-XII grossly intact  HENT: NC/AT. Normal symmetry, bulk and tone of facial and neck musculature. Trachea midline. No discernible thyromegaly or masses. No involuntary movements. Lymphatic: No preauricular, submandibuar, anterior or posterior cervical lymphadenopathy. Psychiatric: The patient is awake, alert, and oriented to person, place and time. Behavior is normal. Thought content normal.   Cardiovascular: No clubbing, cyanosis. No edema bilateral lower extremities. Pulmonary: No tripoding nor accessory muscle recruitment. Breathing normally, no distress, no audible wheezing. Distal cap refill intact at 2/2 Abdiel UE / LE. Neuro intact Abdiel UE/LE to noxious stimuli        Ortho Specific exam:    Left hand reveals skin intact. No warmth, erythema, edema, or ecchymosis. There is positive Tinel's sign in the left carpal tunnel space.  strength is weaker in the left hand when compared to the right. Intrinsic strength also thumb index index long finger weak against resistance when compared to the right hand in the same pattern. IMPRESSION:      ICD-10-CM ICD-9-CM    1. Left carpal tunnel syndrome  G56.02 354.0    2. Numbness and tingling of right upper extremity  R20.0 782.0     R20.2          PLAN: Today we discussed alternatives to care to include but not limited to a low-dose cortisone injection for her left carpal tunnel space to treat her clinical symptoms of carpal tunnel disease. Patient agreed. I would like her to undergo an EMG nerve conduction study prior to seeing Dr. Paul Dickens on return. Patient agreed. Today all of her questions answered to her satisfaction.     Procedural: Using sterile technique after verbal and written consent were obtained appropriate timeout formed patient left hand placed on a bump at the wrist gently extending the hand the radial artery was identified and noted. The carpal tunnel space was then identified and using 1/2 cc of Kenalog 10 mixed with 1/2 cc of Sensorcaine 0.75% injected with no complications. Patient tolerated the procedure well. Bleeding was minimal and controlled with a Band-Aid. Chart reviewed for the following:   August Reddy PA-C, have reviewed the History, Physical and updated the Allergic reactions for Evelyn Mendoza [place] performed immediately prior to start of procedure:   Mell IZAGUIRRE. Barry CALDERON, have performed the following reviews on [employer] prior to the start of the procedure:            * Patient was identified by name and date of birth   * Agreement on procedure being performed was verified  * Risks and Benefits explained to the patient  * Procedure site verified and marked as necessary  * Patient was positioned for comfort  * Consent was signed and verified             Date of procedure: 12/22/21    Time: 3:11 PM    Procedure performed by:  Otilio Kay PA-C    Provider assisted by: None     Patient assisted by: self    How tolerated by patient: tolerated the procedure well with no complications    Comments: none      No Narcotic indicated today. Patient given pain medication for short term acute pain relief. Goal is to treat patient according to above plan and to ultimately have patient off all pain medications once appropriate. If chronic pain management is required beyond what is expected for current orthopedic problem, will refer patient to pain management.  was reviewed and will be reviewed with every medication refill request.         Patient provided a reminder for a \"due or due soon\" health maintenance. I have asked the patient to schedule an appointment with their primary care provider for follow-up on general health maintenance concerns. Today all the patient's questions were answered to their satisfaction.   Copies of x-rays reviewed if obtained this visit, and provided to patient. Dictation disclaimer:  Please note that this dictation was completed with [employer], the computer voice recognition software. Quite often unanticipated grammatical, syntax, homophones, and other interpretive errors are inadvertently transcribed by the computer software. Please disregard these errors. Please excuse any errors that have escaped final proofreading. Marlon Schaumann Brillhart APA, APC, MPAS, PA-C  [facility]

## 2022-03-11 ENCOUNTER — OFFICE VISIT (OUTPATIENT)
Dept: ORTHOPEDIC SURGERY | Age: 32
End: 2022-03-11
Payer: OTHER GOVERNMENT

## 2022-03-11 VITALS
BODY MASS INDEX: 33.86 KG/M2 | SYSTOLIC BLOOD PRESSURE: 115 MMHG | OXYGEN SATURATION: 99 % | WEIGHT: 184 LBS | TEMPERATURE: 97.7 F | DIASTOLIC BLOOD PRESSURE: 77 MMHG | HEART RATE: 86 BPM | HEIGHT: 62 IN

## 2022-03-11 DIAGNOSIS — G56.02 LEFT CARPAL TUNNEL SYNDROME: Primary | ICD-10-CM

## 2022-03-11 PROCEDURE — 99215 OFFICE O/P EST HI 40 MIN: CPT | Performed by: ORTHOPAEDIC SURGERY

## 2022-03-11 NOTE — PROGRESS NOTES
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 Worker's Compensation and legal considerations: none filed. Vitals:    03/11/22 1358   BP: 115/77   Pulse: 86   Temp: 97.7 °F (36.5 °C)   TempSrc: Temporal   SpO2: 99%   Weight: 184 lb (83.5 kg)   Height: 5' 2\" (1.575 m)   PainSc:   0 - No pain   PainLoc: Hand           Chief Complaint   Patient presents with    Follow-up     EMG test results       HPI: Patient presents today for left upper extremity EMG review and wanting to set up surgery. 9/9/2021 HPI: Patient presents today requesting surgery for her left carpal tunnel syndrome. We previously scheduled EMGs but she canceled these and has not had them rescheduled. She would also like an injection today. 8/25/2021 HPI: Patient presents today with a new problem of left hand pain numbness and tingling. She previously had right carpal tunnel release but has not been seen for follow-up since. She reports that it healed well however she still has some tenderness on the right hand. Preop HPI: Patient presents today for preoperative history and physical as she is scheduled for a right carpal tunnel release. She previously had injections that helped however the numbness is returning on the right. Initial HPI: Patient comes in today with complaints of bilateral hand pain and numbness significantly worse than the right. She is recently had a right EMG but not a left. Patient says that she will have to wait till the spring to set up surgery for the right side.     Date of onset: Chronic    Injury: No    Prior Treatment:  Yes: Comment: Bilateral carpal tunnel injections and right carpal tunnel release    Numbness/ Tingling: Yes: Comment: Bilateral      ROS: Review of Systems - General ROS: negative  Psychological ROS: negative  ENT ROS: negative  Allergy and Immunology ROS: negative  Hematological and Lymphatic ROS: negative  Respiratory ROS: no cough, shortness of breath, or wheezing  Cardiovascular ROS: no chest pain or dyspnea on exertion  Gastrointestinal ROS: no abdominal pain, change in bowel habits, or black or bloody stools  Musculoskeletal ROS: positive for - pain in hand - right  Neurological ROS: positive for - numbness/tingling  Dermatological ROS: negative    Past Medical History:   Diagnosis Date    Asthma     High cholesterol        Past Surgical History:   Procedure Laterality Date    FOOT/TOES SURGERY PROC UNLISTED Left 02/2016    left foot reconstruction    HAND/FINGER SURGERY UNLISTED Right     right hand CTR       Current Outpatient Medications   Medication Sig Dispense Refill    dextroamphetamine-amphetamine (AdderalL) 20 mg tablet Take 20 mg by mouth.  ergocalciferol (ERGOCALCIFEROL) 1,250 mcg (50,000 unit) capsule TAKE 1 CAPSULE BY MOUTH ONCE A WEEK      levothyroxine (SYNTHROID) 25 mcg tablet TAKE 1 TAB IN THE MORNING ON AN EMPTY STOMACH. PLEASE WAIT 30 MINUTES BEFORE TAKING ANY OTHER MEDS      montelukast (SINGULAIR) 10 mg tablet TAKE 1 TABLET BY MOUTH EVERYDAY AT BEDTIME      loratadine (Claritin) 10 mg tablet Take 20 mg by mouth daily.  fluticasone propionate (Flonase Allergy Relief) 50 mcg/actuation nasal spray 2 Sprays by Both Nostrils route daily.  USTEKINAUMAB 90 mg/mL injection 90 mg by SubCUTAneous route every three (3) months.  VENTOLIN HFA 90 mcg/actuation inhaler inhale 1-2 puff by mouth four times a day if needed as directed  0    buPROPion XL (WELLBUTRIN XL) 300 mg XL tablet Take 200 mg by mouth every morning. 0    clobetasol 0.05 % sham       drospirenone-ethinyl estradioL (MULU) 3-0.02 mg tab Take 1 Tab by mouth daily. (Patient not taking: Reported on 9/9/2021)      gabapentin (NEURONTIN) 300 mg capsule Take 600 mg by mouth nightly.  (Patient not taking: Reported on 3/11/2022)      valACYclovir (VALTREX) 1 gram tablet TAKE 2 TABLETS BY MOUTH AT FIRST SIGN OF RASH, REPEAT DOSE IN 12 HOURS (Patient not taking: Reported on 3/11/2022)         No Known Allergies        PE:     Physical Exam  Vitals and nursing note reviewed. Constitutional:       General: She is not in acute distress. Appearance: Normal appearance. She is not ill-appearing, toxic-appearing or diaphoretic. HENT:      Head: Normocephalic and atraumatic. Nose: Nose normal.      Mouth/Throat:      Mouth: Mucous membranes are moist.   Eyes:      Extraocular Movements: Extraocular movements intact. Pupils: Pupils are equal, round, and reactive to light. Cardiovascular:      Pulses: Normal pulses. Pulmonary:      Effort: Pulmonary effort is normal. No respiratory distress. Abdominal:      General: Abdomen is flat. There is no distension. Musculoskeletal:         General: No swelling, tenderness, deformity or signs of injury. Normal range of motion. Cervical back: Normal range of motion and neck supple. Right lower leg: No edema. Left lower leg: No edema. Skin:     General: Skin is warm and dry. Capillary Refill: Capillary refill takes less than 2 seconds. Findings: No bruising or erythema. Neurological:      General: No focal deficit present. Mental Status: She is alert and oriented to person, place, and time. Cranial Nerves: No cranial nerve deficit. Sensory: No sensory deficit. Psychiatric:         Mood and Affect: Mood normal.         Behavior: Behavior normal.            NEUROVASCULAR    Examination L R Examination L R   Carpal Comp. -  Pronator Comp. - -   Carpal Tinel +  Pronator Tinel - -   Phalen's ? Pronator Stress - -   Cubital Comp. - - Guyon Comp. - -   Cubital Tinel - - Guyon Tinel - -   Elbow Hyperflexion - - Adson's - -   Spurling's - - SC Comp. - -   PCB Median abn - - SC Tinel - -   Radial Tinel - - IC Comp.  - -   Digital Tinel - - IC Tinel - -   Radial 2-Pt WNL WNL Ulnar 2-Pt WNL WNL     Radial Pulse: 2+  Capillary Refill: < 2 sec  Antony: Not Performed  [employer]: Not Performed      Left upper extremity EMG: Positive for mild left carpal tunnel syndrome      Right   NCV & EMG Findings:  Evaluation of the right median motor nerve showed prolonged distal onset latency (5.1 ms). The right median sensory nerve showed no response (Wrist). All remaining nerves (as indicated in the following tables) were within normal limits.       All examined muscles (as indicated in the following table) showed no evidence of electrical instability.       INTERPRETATION  This is an abnormal electrodiagnostic examination. These findings may be consistent with:   1. Moderate/severe median mononeuropathy at the right wrist (carpal tunnel syndrome)     There is no electrodiagnostic evidence of any cervical radiculopathy, brachial plexopathy, peripheral polyneuropathy, or any other mononeuropathy.     CLINICAL INTERPRETATION  Her electrodiagnostic findings of right carpal tunnel syndrome are consistent with her right arm symptoms. Imaging:     None indicated        ICD-10-CM ICD-9-CM    1. Left carpal tunnel syndrome  G56.02 354.0 SCHEDULE SURGERY         Plan:     Repeat left carpal tunnel injection and reschedule EMG. Continue nighttime brace wear. 45 minutes was spent reviewing EMG, discussing postoperative convalescence, nonoperative versus operative treatment, and obtaining informed consent. Follow-up and Dispositions    · Return for postop.           Plan was reviewed with patient, who verbalized agreement and understanding of the plan    [facility] NOTE        Chart reviewed for the following:   Marc IZAGUIRRE DO, have reviewed the History, Physical and updated the Allergic reactions for [place] [address] [facility]  [address] performed immediately prior to start of procedure:   Marc IZAGUIRRE DO, have performed the following reviews on [facility] prior to the start of the procedure:            * Patient was identified by name and date of birth   * Agreement on procedure being performed was verified  * Risks and Benefits explained to the patient  * Procedure site verified and marked as necessary  * Patient was positioned for comfort  * Consent was signed and verified     Time: 11:20 AM      Date of procedure: 3/11/2022    Procedure performed by: Henry Worrell DO    Provider assisted by: Ranjana Jose MA    Patient assisted by: self    How tolerated by patient: tolerated the procedure well with no complications    Post Procedural Pain Scale: 0 - No Hurt    Comments: none    Procedure:  After consent was obtained, using sterile technique the left carpal tunnel was prepped. Local anesthetic used: 1% lidocaine. Kenalog 5 mg and was then injected and the needle withdrawn. The procedure was well tolerated. The patient is asked to continue to rest the area for a few more days before resuming regular activities. It may be more painful for the first 1-2 days. Watch for fever, or increased swelling or persistent pain in the joint. Call or return to clinic prn if such symptoms occur or there is failure to improve as anticipated.

## 2022-03-14 DIAGNOSIS — G56.02 LEFT CARPAL TUNNEL SYNDROME: Primary | ICD-10-CM

## 2022-03-14 DIAGNOSIS — Z98.890 S/P CARPAL TUNNEL RELEASE: ICD-10-CM

## 2022-03-14 RX ORDER — HYDROCODONE BITARTRATE AND ACETAMINOPHEN 5; 325 MG/1; MG/1
1 TABLET ORAL
Qty: 12 TABLET | Refills: 0 | Status: SHIPPED | OUTPATIENT
Start: 2022-03-14 | End: 2022-03-15 | Stop reason: RX

## 2022-03-15 ENCOUNTER — TELEPHONE (OUTPATIENT)
Dept: ORTHOPEDIC SURGERY | Age: 32
End: 2022-03-15

## 2022-03-15 DIAGNOSIS — G56.02 LEFT CARPAL TUNNEL SYNDROME: Primary | ICD-10-CM

## 2022-03-15 DIAGNOSIS — Z98.890 S/P CARPAL TUNNEL RELEASE: ICD-10-CM

## 2022-03-15 RX ORDER — OXYCODONE AND ACETAMINOPHEN 5; 325 MG/1; MG/1
1 TABLET ORAL
Qty: 12 TABLET | Refills: 0 | Status: SHIPPED | OUTPATIENT
Start: 2022-03-15 | End: 2022-03-18